# Patient Record
(demographics unavailable — no encounter records)

---

## 2025-06-13 NOTE — LETTER BODY
[FreeTextEntry1] : Eduard Valencia, DO [address] [facility] [address] [place] [id number] [phone]   Dear Dr. Valencia,    Reason for Visit: Elevated PSA. BPH. Microscopic hematuria.    This is a 80-year-old gentleman with hypertension and previous microscopic hematuria presenting with chronic BPH and elevated PSA s/p negative prostate biopsy in 2015. His prostate MRI in April 2022 demonstrated an enlarged prostate measuring 95 cc with no suspicious prostatic lesion, PI-RADS 2. His hematuria evaluation in April 2021 was negative. His cystoscopy demonstrated bilobar hypertrophy obstructing the urinary outlet, but otherwise unremarkable. The patient returns for follow-up. He continues to take Flomax QD and Proscar regularly without any side effects or difficulties. The patient notes stable strong uroflow and stable urinary symptoms on medical therapy. He denies any gross hematuria, dysuria or urinary incontinence. Patient denies any other changes in health. The past medical history, family history and social history are unchanged. All other review of systems are negative. Patient denies any changes in medications. Medication list was reconciled.    On examination, the patient is an older-appearing gentleman in no acute distress. He is alert and oriented follows commands. He has normal mood and affect. He is normocephalic. Oral no thrush. Neck is supple. Respirations are unlabored. His abdomen is soft and nontender. Liver is nonpalpable. Bladder is nonpalpable. No CVA tenderness. Neurologically he is grossly intact. No peripheral edema. Skin without gross abnormality. He has normal male external genitalia.    His BMP in May 2025 demonstrated normal renal functions, creatinine 1.02. His PSA was 2.4, which is WNL.  Assessment: Elevated PSA. BPH, symptoms stable on Flomax QD and Proscar. Microscopic hematuria.  I counseled the patient. In terms of his elevated PSA, his prostate MRI demonstrated an enlarged prostate with no suspicious prostatic lesion, PI-RADS 2. I reassured the patient. I believe the risk of occult malignancy is low. In terms of his BPH, since his symptoms are stable, I recommended he continue taking Flomax QD and Proscar. I renewed the patient's prescription for Flomax and Proscar today. I encouraged the patient to continue medications regularly as directed. In terms of his previous microscopic hematuria, I recommended the patient obtain urinalysis and urine cytology to look for infections and high grade urothelial carcinoma. Risks and alternatives were discussed. I answered the patient questions. The patient will follow-up as directed and will contact me with any questions or concerns. Thank you for the opportunity to participate in the care of Mr. KULKARNI. I will keep you updated on his progress.    Plan: Continue Flomax QD and Proscar. Urinalysis. Urine cytology. Follow-up in 1 year.

## 2025-06-13 NOTE — ADDENDUM
[FreeTextEntry1] :  Entered by Shirley Briseno, acting as scribe for Dr Joseph Lloyd. The documentation recorded by the scribe accurately reflects the service I personally performed and the decisions made by me.

## 2025-06-13 NOTE — LETTER BODY
[FreeTextEntry1] : Eduard Valencia, DO [address] [facility] [address] [place] [id number] [phone]   Dear Dr. Valencia,    Reason for Visit: Elevated PSA. BPH. Microscopic hematuria.    This is a 80-year-old gentleman with hypertension and previous microscopic hematuria presenting with chronic BPH and elevated PSA s/p negative prostate biopsy in 2015. His prostate MRI in April 2022 demonstrated an enlarged prostate measuring 95 cc with no suspicious prostatic lesion, PI-RADS 2. His hematuria evaluation in April 2021 was negative. His cystoscopy demonstrated bilobar hypertrophy obstructing the urinary outlet, but otherwise unremarkable. The patient returns for follow-up. He continues to take Flomax QD and Proscar regularly without any side effects or difficulties. The patient notes stable strong uroflow and stable urinary symptoms on medical therapy. He denies any gross hematuria, dysuria or urinary incontinence. Patient denies any other changes in health. The past medical history, family history and social history are unchanged. All other review of systems are negative. Patient denies any changes in medications. Medication list was reconciled.    On examination, the patient is an older-appearing gentleman in no acute distress. He is alert and oriented follows commands. He has normal mood and affect. He is normocephalic. Oral no thrush. Neck is supple. Respirations are unlabored. His abdomen is soft and nontender. Liver is nonpalpable. Bladder is nonpalpable. No CVA tenderness. Neurologically he is grossly intact. No peripheral edema. Skin without gross abnormality. He has normal male external genitalia.    His BMP in May 2025 demonstrated normal renal functions, creatinine 1.02. His PSA was 2.4, which is WNL.  Assessment: Elevated PSA. BPH, symptoms stable on Flomax QD and Proscar. Microscopic hematuria.  I counseled the patient. In terms of his elevated PSA, his prostate MRI demonstrated an enlarged prostate with no suspicious prostatic lesion, PI-RADS 2. I reassured the patient. I believe the risk of occult malignancy is low. In terms of his BPH, since his symptoms are stable, I recommended he continue taking Flomax QD and Proscar. I renewed the patient's prescription for Flomax and Proscar today. I encouraged the patient to continue medications regularly as directed. In terms of his previous microscopic hematuria, I recommended the patient obtain urinalysis and urine cytology to look for infections and high grade urothelial carcinoma. Risks and alternatives were discussed. I answered the patient questions. The patient will follow-up as directed and will contact me with any questions or concerns. Thank you for the opportunity to participate in the care of Mr. KULKARNI. I will keep you updated on his progress.    Plan: Continue Flomax QD and Proscar. Urinalysis. Urine cytology. Follow-up in 1 year.

## 2025-06-13 NOTE — HISTORY OF PRESENT ILLNESS
[FreeTextEntry1] : F/U for BPH, stable on Flomax and Prsocar QD F/U for elevated PSA negative biopsy in 2015, negative MRI in 2022, PSA 2.4 in May 2025 F/U for micro hematuria, asymptomatic, negative studies in 2021   Please refer to URO Consult note